# Patient Record
Sex: FEMALE | Race: WHITE | Employment: PART TIME | URBAN - METROPOLITAN AREA
[De-identification: names, ages, dates, MRNs, and addresses within clinical notes are randomized per-mention and may not be internally consistent; named-entity substitution may affect disease eponyms.]

---

## 2019-05-10 ENCOUNTER — HOSPITAL ENCOUNTER (OUTPATIENT)
Dept: ULTRASOUND IMAGING | Age: 24
Discharge: HOME | End: 2019-05-10
Payer: COMMERCIAL

## 2019-05-10 DIAGNOSIS — N63.0 BREAST LUMP: ICD-10-CM

## 2019-05-10 PROCEDURE — 76641 ULTRASOUND BREAST COMPLETE: CPT

## 2019-06-04 ENCOUNTER — APPOINTMENT (OUTPATIENT)
Dept: GENERAL RADIOLOGY | Age: 24
End: 2019-06-04
Payer: COMMERCIAL

## 2019-06-04 ENCOUNTER — HOSPITAL ENCOUNTER (EMERGENCY)
Age: 24
Discharge: HOME | End: 2019-06-04
Payer: COMMERCIAL

## 2019-06-04 VITALS
TEMPERATURE: 98.8 F | DIASTOLIC BLOOD PRESSURE: 73 MMHG | OXYGEN SATURATION: 97 % | WEIGHT: 180 LBS | SYSTOLIC BLOOD PRESSURE: 116 MMHG | BODY MASS INDEX: 33.99 KG/M2 | HEART RATE: 81 BPM | HEIGHT: 61 IN | RESPIRATION RATE: 20 BRPM

## 2019-06-04 DIAGNOSIS — S93.402A SPRAIN OF LEFT ANKLE, UNSPECIFIED LIGAMENT, INITIAL ENCOUNTER: Primary | ICD-10-CM

## 2019-06-04 PROCEDURE — 6370000000 HC RX 637 (ALT 250 FOR IP): Performed by: NURSE PRACTITIONER

## 2019-06-04 PROCEDURE — 73610 X-RAY EXAM OF ANKLE: CPT

## 2019-06-04 PROCEDURE — 99283 EMERGENCY DEPT VISIT LOW MDM: CPT

## 2019-06-04 RX ORDER — IBUPROFEN 400 MG/1
400 TABLET ORAL ONCE
Status: COMPLETED | OUTPATIENT
Start: 2019-06-04 | End: 2019-06-04

## 2019-06-04 RX ADMIN — IBUPROFEN 400 MG: 400 TABLET, FILM COATED ORAL at 01:35

## 2019-06-04 ASSESSMENT — PAIN DESCRIPTION - FREQUENCY: FREQUENCY: CONTINUOUS

## 2019-06-04 ASSESSMENT — PAIN SCALES - GENERAL
PAINLEVEL_OUTOF10: 7
PAINLEVEL_OUTOF10: 7

## 2019-06-04 ASSESSMENT — PAIN DESCRIPTION - DESCRIPTORS: DESCRIPTORS: PRESSURE;NUMBNESS

## 2019-06-04 ASSESSMENT — PAIN DESCRIPTION - ORIENTATION: ORIENTATION: LEFT

## 2019-06-04 ASSESSMENT — PAIN DESCRIPTION - PAIN TYPE: TYPE: ACUTE PAIN

## 2019-06-04 ASSESSMENT — PAIN DESCRIPTION - LOCATION: LOCATION: ANKLE

## 2019-06-04 NOTE — ED NOTES
Ice pack placed on patient's left ankle prior to going to xray. Air cast applied to left ankle and crutches given.      Avinash Martinez RN  06/04/19 9590

## 2019-06-04 NOTE — ED PROVIDER NOTES
[facility]     Department of Emergency Medicine   ED  Provider Note  Admit Date/RoomTime: 6/4/2019  1:02 AM  ED Room: 12/12  Chief Complaint:   Fall and Ankle Pain (left ankle)    History of Present Illness   Source of history provided by:  patient and EMS personnel. History/Exam Limitations: none. Fay Taylor is a 26 y.o. old female presenting to the emergency department by private vehicle, for Left ankle pain which occured 1 hour(s) prior to arrival.  Cause of complaint: [employer] trying not to step on her pet rabbit while at home. There has been a history of no prior problems with this area in the past.  Since onset the symptoms have been moderate in degree with inability to bear weight. Her pain is aggraveated by walking and relieved by nothing. Tetanus Status: unknown. ROS    Pertinent positives and negatives are stated within HPI, all other systems reviewed and are negative. Past Surgical History:   Procedure Laterality Date    TOE SURGERY     Social History:  reports that she has been smoking cigarettes. She has a 0.75 pack-year smoking history. She has never used smokeless tobacco. She reports that she does not drink alcohol or use drugs. Family History: family history is not on file. Allergies: Latex and Penicillins    Physical Exam           ED Triage Vitals   BP Temp Temp src Pulse Resp SpO2 Height Weight   -- -- -- -- -- -- -- --       Oxygen Saturation Interpretation: Normal.    Constitutional:  Alert, development consistent with age. Neck:  Normal ROM. Supple. Ankle:  Left Lateral:              Tenderness:  moderate. Swelling: Mild. Deformity: no.             ROM: diminished range with pain. Skin:  no erythema, rash or wounds noted. Neurovascular: Motor deficit: none. Sensory deficit:   none. Pulse deficit: none. Capillary refill: normal.  Knee:              Tenderness:  none. Swelling: None. Deformity: no.             ROM: full range of motion. Skin:  no erythema, rash or wounds noted. Foot:              Tenderness:  none. Swelling: None. Deformity: no.             ROM: full range of motion. Skin:  no erythema, rash or wounds noted. Gait:  limp. Lymphatics: No lymphangitis or adenopathy noted. Neurological:  Oriented. Motor functions intact. Lab / Imaging Results   (All laboratory and radiology results have been personally reviewed by myself)  Labs:  No results found for this visit on 06/04/19. Imaging: All Radiology results interpreted by Radiologist unless otherwise noted. XR ANKLE LEFT (MIN 3 VIEWS)    (Results Pending)     ED Course / Medical Decision Making     Medications   ibuprofen (ADVIL;MOTRIN) tablet 400 mg (400 mg Oral Given 6/4/19 0133)        Consults:   None    Procedure(s):   none    MDM:      X-rays negative for acute fracture or dislocation. Plan is subsequently for symptom control, limited use and  immobilization with appropriate outpatient follow-up. Counseling: The emergency provider has spoken with the patient and discussed todays results, in addition to providing specific details for the plan of care and counseling regarding the diagnosis and prognosis. Questions are answered at this time and they are agreeable with the plan. Assessment      1. Sprain of left ankle, unspecified ligament, initial encounter      Plan   Discharge to home  Patient condition is good    New Medications     New Prescriptions    No medications on file     Electronically signed by DEYVI Ibrahim Chi, CNP   DD: 6/4/19  **This report was transcribed using voice recognition software. Every effort was made to ensure accuracy; however, inadvertent computerized transcription errors may be present.   END OF ED PROVIDER NOTE       DEYVI Smith CNP  06/04/19 0216    ATTENDING PROVIDER ATTESTATION:     Supervising Physician, on-site, available for consultation, non-participatory in the evaluation or care of this patient.          [facility] [facility],   06/04/19 3373

## 2021-06-05 ENCOUNTER — APPOINTMENT (OUTPATIENT)
Dept: GENERAL RADIOLOGY | Age: 26
End: 2021-06-05

## 2021-06-05 ENCOUNTER — HOSPITAL ENCOUNTER (EMERGENCY)
Age: 26
Discharge: HOME | End: 2021-06-05

## 2021-06-05 VITALS
DIASTOLIC BLOOD PRESSURE: 77 MMHG | WEIGHT: 170 LBS | RESPIRATION RATE: 20 BRPM | SYSTOLIC BLOOD PRESSURE: 119 MMHG | HEART RATE: 79 BPM | OXYGEN SATURATION: 98 % | BODY MASS INDEX: 32.12 KG/M2 | TEMPERATURE: 98.9 F

## 2021-06-05 DIAGNOSIS — J40 BRONCHITIS: Primary | ICD-10-CM

## 2021-06-05 LAB
ANION GAP SERPL CALCULATED.3IONS-SCNC: 13 MMOL/L (ref 7–16)
BASOPHILS ABSOLUTE: 0.07 E9/L (ref 0–0.2)
BASOPHILS RELATIVE PERCENT: 0.8 % (ref 0–2)
BUN BLDV-MCNC: 9 MG/DL (ref 6–20)
CALCIUM SERPL-MCNC: 8.9 MG/DL (ref 8.6–10.2)
CHLORIDE BLD-SCNC: 105 MMOL/L (ref 98–107)
CO2: 22 MMOL/L (ref 22–29)
CREAT SERPL-MCNC: 0.7 MG/DL (ref 0.5–1)
D DIMER: <200 NG/ML DDU
EOSINOPHILS ABSOLUTE: 0.39 E9/L (ref 0.05–0.5)
EOSINOPHILS RELATIVE PERCENT: 4.4 % (ref 0–6)
GFR AFRICAN AMERICAN: >60
GFR NON-AFRICAN AMERICAN: >60 ML/MIN/1.73
GLUCOSE BLD-MCNC: 96 MG/DL (ref 74–99)
HCG, URINE, POC: NEGATIVE
HCT VFR BLD CALC: 44.9 % (ref 34–48)
HEMOGLOBIN: 15.4 G/DL (ref 11.5–15.5)
IMMATURE GRANULOCYTES #: 0.04 E9/L
IMMATURE GRANULOCYTES %: 0.4 % (ref 0–5)
LYMPHOCYTES ABSOLUTE: 1.6 E9/L (ref 1.5–4)
LYMPHOCYTES RELATIVE PERCENT: 17.9 % (ref 20–42)
Lab: NORMAL
MAGNESIUM: 2.1 MG/DL (ref 1.6–2.6)
MCH RBC QN AUTO: 30.7 PG (ref 26–35)
MCHC RBC AUTO-ENTMCNC: 34.3 % (ref 32–34.5)
MCV RBC AUTO: 89.6 FL (ref 80–99.9)
MONOCYTES ABSOLUTE: 0.89 E9/L (ref 0.1–0.95)
MONOCYTES RELATIVE PERCENT: 10 % (ref 2–12)
NEGATIVE QC PASS/FAIL: NORMAL
NEUTROPHILS ABSOLUTE: 5.94 E9/L (ref 1.8–7.3)
NEUTROPHILS RELATIVE PERCENT: 66.5 % (ref 43–80)
PDW BLD-RTO: 12.7 FL (ref 11.5–15)
PLATELET # BLD: 229 E9/L (ref 130–450)
PMV BLD AUTO: 10 FL (ref 7–12)
POSITIVE QC PASS/FAIL: NORMAL
POTASSIUM REFLEX MAGNESIUM: 3.5 MMOL/L (ref 3.5–5)
RBC # BLD: 5.01 E12/L (ref 3.5–5.5)
SARS-COV-2, NAAT: NOT DETECTED
SODIUM BLD-SCNC: 140 MMOL/L (ref 132–146)
TROPONIN, HIGH SENSITIVITY: <6 NG/L (ref 0–9)
WBC # BLD: 8.9 E9/L (ref 4.5–11.5)

## 2021-06-05 PROCEDURE — 71046 X-RAY EXAM CHEST 2 VIEWS: CPT

## 2021-06-05 PROCEDURE — 2580000003 HC RX 258: Performed by: PHYSICIAN ASSISTANT

## 2021-06-05 PROCEDURE — 6370000000 HC RX 637 (ALT 250 FOR IP): Performed by: PHYSICIAN ASSISTANT

## 2021-06-05 PROCEDURE — 94640 AIRWAY INHALATION TREATMENT: CPT

## 2021-06-05 PROCEDURE — 83735 ASSAY OF MAGNESIUM: CPT

## 2021-06-05 PROCEDURE — 85378 FIBRIN DEGRADE SEMIQUANT: CPT

## 2021-06-05 PROCEDURE — 87635 SARS-COV-2 COVID-19 AMP PRB: CPT

## 2021-06-05 PROCEDURE — 85025 COMPLETE CBC W/AUTO DIFF WBC: CPT

## 2021-06-05 PROCEDURE — 84484 ASSAY OF TROPONIN QUANT: CPT

## 2021-06-05 PROCEDURE — 99283 EMERGENCY DEPT VISIT LOW MDM: CPT

## 2021-06-05 PROCEDURE — 80048 BASIC METABOLIC PNL TOTAL CA: CPT

## 2021-06-05 PROCEDURE — 93005 ELECTROCARDIOGRAM TRACING: CPT | Performed by: PHYSICIAN ASSISTANT

## 2021-06-05 RX ORDER — PREDNISONE 50 MG/1
50 TABLET ORAL DAILY
Qty: 5 TABLET | Refills: 0 | Status: SHIPPED | OUTPATIENT
Start: 2021-06-05 | End: 2021-06-10

## 2021-06-05 RX ORDER — ALBUTEROL SULFATE 90 UG/1
2 AEROSOL, METERED RESPIRATORY (INHALATION) 4 TIMES DAILY PRN
Qty: 1 INHALER | Refills: 0 | Status: SHIPPED | OUTPATIENT
Start: 2021-06-05 | End: 2021-09-01

## 2021-06-05 RX ORDER — IPRATROPIUM BROMIDE AND ALBUTEROL SULFATE 2.5; .5 MG/3ML; MG/3ML
1 SOLUTION RESPIRATORY (INHALATION) ONCE
Status: COMPLETED | OUTPATIENT
Start: 2021-06-05 | End: 2021-06-05

## 2021-06-05 RX ORDER — 0.9 % SODIUM CHLORIDE 0.9 %
1000 INTRAVENOUS SOLUTION INTRAVENOUS ONCE
Status: COMPLETED | OUTPATIENT
Start: 2021-06-05 | End: 2021-06-05

## 2021-06-05 RX ADMIN — IPRATROPIUM BROMIDE AND ALBUTEROL SULFATE 1 AMPULE: .5; 3 SOLUTION RESPIRATORY (INHALATION) at 22:07

## 2021-06-05 RX ADMIN — SODIUM CHLORIDE 1000 ML: 9 INJECTION, SOLUTION INTRAVENOUS at 21:24

## 2021-06-05 RX ADMIN — PREDNISONE 50 MG: 20 TABLET ORAL at 22:00

## 2021-06-05 ASSESSMENT — ENCOUNTER SYMPTOMS
VOMITING: 0
SINUS PAIN: 0
FACIAL SWELLING: 0
CONSTIPATION: 0
CHEST TIGHTNESS: 0
ABDOMINAL PAIN: 0
SINUS PRESSURE: 0
COLOR CHANGE: 0
COUGH: 1
DIARRHEA: 0
BACK PAIN: 0
WHEEZING: 0
SORE THROAT: 0
NAUSEA: 0
SHORTNESS OF BREATH: 1
TROUBLE SWALLOWING: 0

## 2021-06-05 ASSESSMENT — PAIN SCALES - GENERAL: PAINLEVEL_OUTOF10: 8

## 2021-06-05 ASSESSMENT — PAIN DESCRIPTION - ORIENTATION: ORIENTATION: RIGHT

## 2021-06-05 ASSESSMENT — PAIN DESCRIPTION - LOCATION: LOCATION: FLANK

## 2021-06-05 ASSESSMENT — PAIN DESCRIPTION - PAIN TYPE: TYPE: ACUTE PAIN

## 2021-06-06 LAB
EKG ATRIAL RATE: 82 BPM
EKG P AXIS: 58 DEGREES
EKG P-R INTERVAL: 154 MS
EKG Q-T INTERVAL: 352 MS
EKG QRS DURATION: 76 MS
EKG QTC CALCULATION (BAZETT): 411 MS
EKG R AXIS: 68 DEGREES
EKG T AXIS: 52 DEGREES
EKG VENTRICULAR RATE: 82 BPM

## 2021-06-06 PROCEDURE — 93010 ELECTROCARDIOGRAM REPORT: CPT | Performed by: INTERNAL MEDICINE

## 2021-06-06 NOTE — ED PROVIDER NOTES
[facility]        Department of Emergency Medicine   ED  Provider Note  Admit Date/RoomTime: 6/5/2021  8:15 PM  ED Room: [place] [address]  HPI:  6/5/21, Time: 9:21 PM EDT      The patient is a 53-year-old female presenting to the emergency department for 3-day history of shortness of breath and right-sided chest pain that radiates to her back. Patient states it started after she was cleaning out an old apartment and there was mold in the apartment. Patient states she has an allergy to mold. She also now has a dry cough. Patient states the pain in the right side of her chest is worse when she takes in a deep breath. She does not have a history of asthma but has been using her mom's inhaler without much relief. Patient does smoke. She does have the Nexplanon implant. She states her mother has a history of blood clots. Patient denies any concern for COVID-19. She also denies any fevers or chills, leg pain or swelling, dizziness, numbness/tingling/weakness. The history is provided by the patient. No  was used. REVIEW OF SYSTEMS:  Review of Systems   Constitutional: Negative for activity change, chills, fatigue and fever. HENT: Negative for congestion, ear pain, facial swelling, sinus pressure, sinus pain, sore throat and trouble swallowing. Respiratory: Positive for cough and shortness of breath. Negative for chest tightness and wheezing. Cardiovascular: Positive for chest pain. Negative for palpitations and leg swelling. Gastrointestinal: Negative for abdominal pain, constipation, diarrhea, nausea and vomiting. Genitourinary: Negative for dysuria, flank pain, frequency and hematuria. Musculoskeletal: Negative for back pain, neck pain and neck stiffness. Skin: Negative for color change, pallor and rash. Neurological: Negative for dizziness, weakness, light-headedness and headaches.    Psychiatric/Behavioral: Negative for agitation, behavioral problems and confusion. Pertinent positives and negatives are stated within HPI, all other systems reviewed and are negative.      --------------------------------------------- PAST HISTORY ---------------------------------------------  Past Medical History:  has a past medical history of Complication of anesthesia. Past Surgical History:  has a past surgical history that includes Toe Surgery. Social History:  reports that she has been smoking cigarettes. She has a 0.75 pack-year smoking history. She has never used smokeless tobacco. She reports that she does not drink alcohol and does not use drugs. Family History: family history is not on file. The patients home medications have been reviewed.     Allergies: Latex and Penicillins    -------------------------------------------------- RESULTS -------------------------------------------------  All laboratory and radiology results have been personally reviewed by myself   LABS:  Results for orders placed or performed during the hospital encounter of 06/05/21   COVID-19, Rapid    Specimen: Nasopharyngeal Swab   Result Value Ref Range    SARS-CoV-2, NAAT Not Detected Not Detected   CBC Auto Differential   Result Value Ref Range    WBC 8.9 4.5 - 11.5 E9/L    RBC 5.01 3.50 - 5.50 E12/L    Hemoglobin 15.4 11.5 - 15.5 g/dL    Hematocrit 44.9 34.0 - 48.0 %    MCV 89.6 80.0 - 99.9 fL    MCH 30.7 26.0 - 35.0 pg    MCHC 34.3 32.0 - 34.5 %    RDW 12.7 11.5 - 15.0 fL    Platelets 487 654 - 490 E9/L    MPV 10.0 7.0 - 12.0 fL    Neutrophils % 66.5 43.0 - 80.0 %    Immature Granulocytes % 0.4 0.0 - 5.0 %    Lymphocytes % 17.9 (L) 20.0 - 42.0 %    Monocytes % 10.0 2.0 - 12.0 %    Eosinophils % 4.4 0.0 - 6.0 %    Basophils % 0.8 0.0 - 2.0 %    Neutrophils Absolute 5.94 1.80 - 7.30 E9/L    Immature Granulocytes # 0.04 E9/L    Lymphocytes Absolute 1.60 1.50 - 4.00 E9/L    Monocytes Absolute 0.89 0.10 - 0.95 E9/L    Eosinophils Absolute 0.39 0.05 - 0.50 E9/L    Basophils Absolute 0. 07 0.00 - 0.20 O5/J   Basic Metabolic Panel w/ Reflex to MG   Result Value Ref Range    Sodium 140 132 - 146 mmol/L    Potassium reflex Magnesium 3.5 3.5 - 5.0 mmol/L    Chloride 105 98 - 107 mmol/L    CO2 22 22 - 29 mmol/L    Anion Gap 13 7 - 16 mmol/L    Glucose 96 74 - 99 mg/dL    BUN 9 6 - 20 mg/dL    CREATININE 0.7 0.5 - 1.0 mg/dL    GFR Non-African American >60 >=60 mL/min/1.73    GFR African American >60     Calcium 8.9 8.6 - 10.2 mg/dL   D-Dimer, Quantitative   Result Value Ref Range    D-Dimer, Quant <200 ng/mL DDU   Troponin   Result Value Ref Range    Troponin, High Sensitivity <6 0 - 9 ng/L   Magnesium   Result Value Ref Range    Magnesium 2.1 1.6 - 2.6 mg/dL   POC Pregnancy Urine   Result Value Ref Range    HCG, Urine, POC Negative Negative    Lot Number [id number]     Positive QC Pass/Fail Pass     Negative QC Pass/Fail Pass    EKG 12 Lead   Result Value Ref Range    Ventricular Rate 82 BPM    Atrial Rate 82 BPM    P-R Interval 154 ms    QRS Duration 76 ms    Q-T Interval 352 ms    QTc Calculation (Bazett) 411 ms    P Axis 58 degrees    R Axis 68 degrees    T Axis 52 degrees       RADIOLOGY:  Interpreted by Radiologist.  XR CHEST (2 VW)    (Results Pending)       ------------------------- NURSING NOTES AND VITALS REVIEWED ---------------------------   The nursing notes within the ED encounter and vital signs as below have been reviewed. /77   Pulse 79   Temp 98.9 °F (37.2 °C) (Oral)   Resp 20   Wt 170 lb (77.1 kg)   SpO2 98%   BMI 32.12 kg/m²   Oxygen Saturation Interpretation: Normal      ---------------------------------------------------PHYSICAL EXAM--------------------------------------    Physical Exam  Vitals and nursing note reviewed. Constitutional:       General: She is not in acute distress. Appearance: Normal appearance. She is well-developed. She is not ill-appearing. HENT:      Head: Normocephalic and atraumatic.       Mouth/Throat:      Mouth: Mucous membranes are well-appearing. EKG shows normal sinus rhythm at 82 bpm.  Patient given a DuoNeb. Labs are unremarkable including a negative D-dimer and troponin. She also has no leukocytosis to suggest infection. COVID-19 is negative. Chest x-ray shows no acute consolidation. Her breath sounds did significantly improve after 1 breathing treatment. This may be viral bronchitis versus due to the mold exposure. Patient will be given a prescription for prednisone and albuterol inhaler. She is encouraged to follow-up with a primary care physician or immediate return the emergency department any new or worsening symptoms. Patient discharged home in good condition. Counseling: The emergency provider has spoken with the patient and discussed todays results, in addition to providing specific details for the plan of care and counseling regarding the diagnosis and prognosis. Questions are answered at this time and they are agreeable with the plan.      --------------------------------- IMPRESSION AND DISPOSITION ---------------------------------    IMPRESSION  1. Bronchitis        DISPOSITION  Disposition: Discharge to home  Patient condition is good      Electronically signed by Govind Khan PA-C   DD: 6/5/21  **This report was transcribed using voice recognition software. Every effort was made to ensure accuracy; however, inadvertent computerized transcription errors may be present.   END OF ED PROVIDER NOTE          Govind Khan PA-C  06/05/21 9723

## 2021-09-01 ENCOUNTER — HOSPITAL ENCOUNTER (EMERGENCY)
Age: 26
Discharge: HOME | End: 2021-09-01
Attending: EMERGENCY MEDICINE
Payer: MEDICARE

## 2021-09-01 VITALS
WEIGHT: 186 LBS | BODY MASS INDEX: 34.23 KG/M2 | HEART RATE: 86 BPM | SYSTOLIC BLOOD PRESSURE: 120 MMHG | OXYGEN SATURATION: 98 % | HEIGHT: 62 IN | DIASTOLIC BLOOD PRESSURE: 70 MMHG | RESPIRATION RATE: 16 BRPM | TEMPERATURE: 97.3 F

## 2021-09-01 DIAGNOSIS — N39.0 URINARY TRACT INFECTION WITHOUT HEMATURIA, SITE UNSPECIFIED: Primary | ICD-10-CM

## 2021-09-01 LAB
BACTERIA: ABNORMAL /HPF
BILIRUBIN URINE: ABNORMAL
BLOOD, URINE: ABNORMAL
CLARITY: CLEAR
COLOR: ABNORMAL
GLUCOSE URINE: NEGATIVE MG/DL
HCG(URINE) PREGNANCY TEST: NEGATIVE
KETONES, URINE: ABNORMAL MG/DL
LEUKOCYTE ESTERASE, URINE: NEGATIVE
NITRITE, URINE: NEGATIVE
PH UA: 7.5 (ref 5–9)
PROTEIN UA: 30 MG/DL
RBC UA: ABNORMAL /HPF (ref 0–2)
SPECIFIC GRAVITY UA: 1.02 (ref 1–1.03)
UROBILINOGEN, URINE: 2 E.U./DL
WBC UA: ABNORMAL /HPF (ref 0–5)

## 2021-09-01 PROCEDURE — 99282 EMERGENCY DEPT VISIT SF MDM: CPT

## 2021-09-01 PROCEDURE — 81001 URINALYSIS AUTO W/SCOPE: CPT

## 2021-09-01 PROCEDURE — 81025 URINE PREGNANCY TEST: CPT

## 2021-09-01 RX ORDER — NITROFURANTOIN 25; 75 MG/1; MG/1
100 CAPSULE ORAL 2 TIMES DAILY
Qty: 20 CAPSULE | Refills: 0 | Status: SHIPPED | OUTPATIENT
Start: 2021-09-01 | End: 2021-09-11

## 2021-09-01 ASSESSMENT — PAIN DESCRIPTION - LOCATION: LOCATION: ABDOMEN

## 2021-09-01 ASSESSMENT — ENCOUNTER SYMPTOMS
COUGH: 0
SINUS PRESSURE: 0
WHEEZING: 0
EYE DISCHARGE: 0
EYE REDNESS: 0
VOMITING: 0
ABDOMINAL DISTENTION: 0
NAUSEA: 0
EYE PAIN: 0
DIARRHEA: 0
SHORTNESS OF BREATH: 0
BACK PAIN: 0
SORE THROAT: 0

## 2021-09-01 ASSESSMENT — PAIN DESCRIPTION - PAIN TYPE: TYPE: ACUTE PAIN

## 2021-09-01 ASSESSMENT — PAIN DESCRIPTION - PROGRESSION: CLINICAL_PROGRESSION: NOT CHANGED

## 2021-09-01 ASSESSMENT — PAIN DESCRIPTION - ORIENTATION: ORIENTATION: LOWER;MID

## 2021-09-01 ASSESSMENT — PAIN SCALES - GENERAL: PAINLEVEL_OUTOF10: 6

## 2021-09-01 ASSESSMENT — PAIN DESCRIPTION - ONSET: ONSET: ON-GOING

## 2021-09-01 ASSESSMENT — PAIN DESCRIPTION - FREQUENCY: FREQUENCY: INTERMITTENT

## 2021-09-01 ASSESSMENT — PAIN DESCRIPTION - DESCRIPTORS: DESCRIPTORS: SHARP

## 2021-09-01 NOTE — ED PROVIDER NOTES
The history is provided by the patient. Female  Problem  Pain quality:  Aching  Pain severity:  Mild  Onset quality:  Gradual  Timing:  Constant  Progression:  Worsening  Chronicity:  New  Recent urinary tract infections: no    Relieved by:  Nothing  Worsened by:  Nothing  Ineffective treatments:  None tried  Associated symptoms: no fever, no nausea and no vomiting    Risk factors: sexually active         Review of Systems   Constitutional: Negative for chills and fever. HENT: Negative for ear pain, sinus pressure and sore throat. Eyes: Negative for pain, discharge and redness. Respiratory: Negative for cough, shortness of breath and wheezing. Cardiovascular: Negative for chest pain. Gastrointestinal: Negative for abdominal distention, diarrhea, nausea and vomiting. Genitourinary: Positive for pelvic pain. Negative for dysuria and frequency. Musculoskeletal: Negative for arthralgias and back pain. Skin: Negative for rash and wound. Neurological: Negative for weakness and headaches. Hematological: Negative for adenopathy. Psychiatric/Behavioral: Negative. All other systems reviewed and are negative. Physical Exam  Vitals and nursing note reviewed. Constitutional:       Appearance: She is well-developed. HENT:      Head: Normocephalic and atraumatic. Eyes:      Pupils: Pupils are equal, round, and reactive to light. Cardiovascular:      Rate and Rhythm: Normal rate and regular rhythm. Heart sounds: Normal heart sounds. No murmur heard. Pulmonary:      Effort: Pulmonary effort is normal.      Breath sounds: Normal breath sounds. Abdominal:      General: Bowel sounds are normal.      Palpations: Abdomen is soft. Tenderness: There is no abdominal tenderness. There is no guarding or rebound. Musculoskeletal:      Cervical back: Normal range of motion and neck supple. Skin:     General: Skin is warm and dry.    Neurological:      Mental Status: She is alert and oriented to person, place, and time. Psychiatric:         Behavior: Behavior normal.         Thought Content: Thought content normal.         Judgment: Judgment normal.        --------------------------------------------- PAST HISTORY ---------------------------------------------  Past Medical History:  has a past medical history of Complication of anesthesia. Past Surgical History:  has a past surgical history that includes Toe Surgery. Social History:  reports that she has been smoking cigarettes. She has a 0.75 pack-year smoking history. She has never used smokeless tobacco. She reports that she does not drink alcohol and does not use drugs. Family History: family history is not on file. The patients home medications have been reviewed. Allergies: Latex and Penicillins    -------------------------------------------------- RESULTS -------------------------------------------------  Results for orders placed or performed during the hospital encounter of 09/01/21   Urinalysis with Microscopic   Result Value Ref Range    Color, UA DARK YELLOW (A) Straw/Yellow    Clarity, UA Clear Clear    Glucose, Ur Negative Negative mg/dL    Bilirubin Urine SMALL (A) Negative    Ketones, Urine TRACE (A) Negative mg/dL    Specific Gravity, UA 1.020 1.005 - 1.030    Blood, Urine MODERATE (A) Negative    pH, UA 7.5 5.0 - 9.0    Protein, UA 30 (A) Negative mg/dL    Urobilinogen, Urine 2.0 (A) <2.0 E.U./dL    Nitrite, Urine Negative Negative    Leukocyte Esterase, Urine Negative Negative    WBC, UA 1-3 0 - 5 /HPF    RBC, UA 0-1 0 - 2 /HPF    Bacteria, UA MODERATE (A) None Seen /HPF   Pregnancy, urine   Result Value Ref Range    HCG(Urine) Pregnancy Test NEGATIVE NEGATIVE     No orders to display       ------------------------- NURSING NOTES AND VITALS REVIEWED ---------------------------   The nursing notes within the ED encounter and vital signs as below have been reviewed.    /70   Pulse 86   Temp 97.3 °F (36.3 °C) (Temporal)   Resp 16   Ht 5' 2\" (1.575 m)   Wt 186 lb (84.4 kg)   LMP 07/20/2021   SpO2 98%   BMI 34.02 kg/m²   Oxygen Saturation Interpretation: Normal      ------------------------------------------ PROGRESS NOTES ------------------------------------------   I have spoken with the patient and discussed todays results, in addition to providing specific details for the plan of care and counseling regarding the diagnosis and prognosis. Their questions are answered at this time and they are agreeable with the plan.      --------------------------------- ADDITIONAL PROVIDER NOTES ---------------------------------        This patient is stable for discharge. I have shared the specific conditions for return, as well as the importance of follow-up. IMPRESSION:     1.  Urinary tract infection without hematuria, site unspecified      Patient's Medications   New Prescriptions    NITROFURANTOIN, MACROCRYSTAL-MONOHYDRATE, (MACROBID) 100 MG CAPSULE    Take 1 capsule by mouth 2 times daily for 10 days   Previous Medications    No medications on file   Modified Medications    No medications on file   Discontinued Medications    ALBUTEROL SULFATE HFA (VENTOLIN HFA) 108 (90 BASE) MCG/ACT INHALER    Inhale 2 puffs into the lungs 4 times daily as needed for Wheezing    FAMOTIDINE (PEPCID) 20 MG TABLET    Take 1 tablet by mouth 2 times daily for 7 days    PANTOPRAZOLE (PROTONIX) 40 MG TABLET    Take 1 tablet by mouth daily for 10 days         Dionne Sheikh, DO  09/01/21 1152

## 2021-09-01 NOTE — ED NOTES
Had a negative and a positive pregnancy test at home.  Does have a Nexplanon implant in left arm     Rommel Srinivasan RN  09/01/21 5982

## 2021-12-20 ENCOUNTER — APPOINTMENT (OUTPATIENT)
Dept: CT IMAGING | Age: 26
End: 2021-12-20
Payer: MEDICARE

## 2021-12-20 ENCOUNTER — HOSPITAL ENCOUNTER (EMERGENCY)
Age: 26
Discharge: HOME | End: 2021-12-20
Attending: EMERGENCY MEDICINE
Payer: MEDICARE

## 2021-12-20 VITALS
SYSTOLIC BLOOD PRESSURE: 152 MMHG | BODY MASS INDEX: 32.2 KG/M2 | RESPIRATION RATE: 20 BRPM | HEART RATE: 106 BPM | TEMPERATURE: 98.1 F | OXYGEN SATURATION: 98 % | WEIGHT: 175 LBS | HEIGHT: 62 IN | DIASTOLIC BLOOD PRESSURE: 70 MMHG

## 2021-12-20 DIAGNOSIS — N94.9 ADNEXAL CYST: ICD-10-CM

## 2021-12-20 DIAGNOSIS — R10.30 LOWER ABDOMINAL PAIN: Primary | ICD-10-CM

## 2021-12-20 DIAGNOSIS — N93.8 DYSFUNCTIONAL UTERINE BLEEDING: ICD-10-CM

## 2021-12-20 LAB
BACTERIA: ABNORMAL /HPF
BILIRUBIN URINE: NEGATIVE
BLOOD, URINE: ABNORMAL
CLARITY: ABNORMAL
CLUE CELLS: NORMAL
COLOR: YELLOW
EPITHELIAL CELLS, UA: ABNORMAL /HPF
GLUCOSE URINE: NEGATIVE MG/DL
HCG(URINE) PREGNANCY TEST: NEGATIVE
KETONES, URINE: ABNORMAL MG/DL
LEUKOCYTE ESTERASE, URINE: NEGATIVE
NITRITE, URINE: NEGATIVE
PH UA: 5.5 (ref 5–9)
PROTEIN UA: NEGATIVE MG/DL
RBC UA: ABNORMAL /HPF (ref 0–2)
SOURCE WET PREP: NORMAL
SPECIFIC GRAVITY UA: >=1.03 (ref 1–1.03)
TRICHOMONAS PREP: NORMAL
UROBILINOGEN, URINE: 1 E.U./DL
WBC UA: ABNORMAL /HPF (ref 0–5)
YEAST WET PREP: NORMAL

## 2021-12-20 PROCEDURE — 74176 CT ABD & PELVIS W/O CONTRAST: CPT

## 2021-12-20 PROCEDURE — 99283 EMERGENCY DEPT VISIT LOW MDM: CPT

## 2021-12-20 PROCEDURE — 81025 URINE PREGNANCY TEST: CPT

## 2021-12-20 PROCEDURE — 81001 URINALYSIS AUTO W/SCOPE: CPT

## 2021-12-20 PROCEDURE — 87210 SMEAR WET MOUNT SALINE/INK: CPT

## 2021-12-20 ASSESSMENT — ENCOUNTER SYMPTOMS
CONSTIPATION: 0
RHINORRHEA: 0
SHORTNESS OF BREATH: 0
COUGH: 0
NAUSEA: 0
VOMITING: 0
ABDOMINAL DISTENTION: 0
DIARRHEA: 0
ABDOMINAL PAIN: 1

## 2021-12-20 ASSESSMENT — PAIN DESCRIPTION - LOCATION: LOCATION: ABDOMEN

## 2021-12-20 ASSESSMENT — PAIN DESCRIPTION - FREQUENCY: FREQUENCY: CONTINUOUS

## 2021-12-20 ASSESSMENT — PAIN DESCRIPTION - PAIN TYPE: TYPE: ACUTE PAIN

## 2021-12-20 ASSESSMENT — PAIN SCALES - GENERAL: PAINLEVEL_OUTOF10: 7

## 2021-12-20 ASSESSMENT — PAIN DESCRIPTION - ORIENTATION: ORIENTATION: LEFT

## 2021-12-20 NOTE — ED PROVIDER NOTES
Patient is a 79-year-old female with no reported past medical history presents to the emergency department with complaint of abdominal pain. Patient states that she developed left flank pain that was intermittent approximately 4 days ago that is become more constant over the past day. Flank pain is sharp, nonradiating, constant, nothing makes it better, nothing makes it worse, moderate in intensity. Patient also endorses lower abdominal pressure that has been ongoing for the past 3 days. Patient also endorses vaginal bleeding that is off cycle. Patient states that she is on Nexplanon for birth control, had her period earlier this month and then started vaginal bleeding again on 12/16/2021. Patient denies any clots. Patient denies any chest pain or shortness of breath, fevers or chills, urinary or other GI symptoms. Patient denies any trauma. Review of Systems   Constitutional: Negative for appetite change, chills and fever. HENT: Negative for congestion and rhinorrhea. Eyes: Negative for visual disturbance. Respiratory: Negative for cough and shortness of breath. Cardiovascular: Negative for chest pain, palpitations and leg swelling. Gastrointestinal: Positive for abdominal pain. Negative for abdominal distention, constipation, diarrhea, nausea and vomiting. Genitourinary: Positive for vaginal bleeding. Negative for dysuria, frequency, urgency, vaginal discharge and vaginal pain. Musculoskeletal: Negative for arthralgias and myalgias. Skin: Negative for rash and wound. Neurological: Negative for dizziness, syncope, weakness, light-headedness, numbness and headaches. Psychiatric/Behavioral: Negative for confusion. The patient is not nervous/anxious. Physical Exam  Vitals and nursing note reviewed. Constitutional:       General: She is awake. She is not in acute distress. Appearance: She is obese. She is not ill-appearing or toxic-appearing.    HENT:      Head: Ref Range    Color, UA Yellow Straw/Yellow    Clarity, UA SLCLOUDY Clear    Glucose, Ur Negative Negative mg/dL    Bilirubin Urine Negative Negative    Ketones, Urine TRACE (A) Negative mg/dL    Specific Gravity, UA >=1.030 1.005 - 1.030    Blood, Urine LARGE (A) Negative    pH, UA 5.5 5.0 - 9.0    Protein, UA Negative Negative mg/dL    Urobilinogen, Urine 1.0 <2.0 E.U./dL    Nitrite, Urine Negative Negative    Leukocyte Esterase, Urine Negative Negative    WBC, UA 1-3 0 - 5 /HPF    RBC, UA 0-1 0 - 2 /HPF    Epithelial Cells, UA MODERATE /HPF    Bacteria, UA FEW (A) None Seen /HPF   Pregnancy, urine   Result Value Ref Range    HCG(Urine) Pregnancy Test NEGATIVE NEGATIVE       Radiology:  CT ABDOMEN PELVIS WO CONTRAST Additional Contrast? None   Final Result   Small right ovarian cyst measuring 2.9 cm. No acute process otherwise identified. Short-term follow-up recommended if   symptoms persist.      RECOMMENDATIONS:   Unavailable           ------------------------- NURSING NOTES AND VITALS REVIEWED ---------------------------  Date / Time Roomed:  12/20/2021  3:29 AM  ED Bed Assignment:  [email or web address]/[email or web address]    The nursing notes within the ED encounter and vital signs as below have been reviewed. BP (!) 152/70   Pulse 106   Temp 98.1 °F (36.7 °C) (Temporal)   Resp 20   Ht 5' 2\" (1.575 m)   Wt 175 lb (79.4 kg)   LMP 12/20/2021 Comment: 2nd one this month  SpO2 98%   BMI 32.01 kg/m²   Oxygen Saturation Interpretation: Normal      ------------------------------------------ PROGRESS NOTES ------------------------------------------  2:52 AM EST  I have spoken with the patient and discussed todays results, in addition to providing specific details for the plan of care and counseling regarding the diagnosis and prognosis. Their questions are answered at this time and they are agreeable with the plan. I discussed at length with them reasons for immediate return here for re evaluation.  They will followup with their OB/GYN and primary care physician by calling their office tomorrow. --------------------------------- ADDITIONAL PROVIDER NOTES ---------------------------------  At this time the patient is without objective evidence of an acute process requiring hospitalization or inpatient management. They have remained hemodynamically stable throughout their entire ED visit and are stable for discharge with outpatient follow-up. The plan has been discussed in detail and they are aware of the specific conditions for emergent return, as well as the importance of follow-up. There are no discharge medications for this patient. Diagnosis:  1. Lower abdominal pain    2. Dysfunctional uterine bleeding    3. Adnexal cyst      Disposition:  Patient's disposition: Discharge to home  Patient's condition is stable. 12/20/21, 2:52 AM EST.     This note is prepared by Marimar Yoo MD -PGY-2                   Marimar Yoo MD  Resident  12/20/21 9381

## 2022-03-11 ENCOUNTER — APPOINTMENT (OUTPATIENT)
Dept: GENERAL RADIOLOGY | Age: 27
End: 2022-03-11
Payer: MEDICARE

## 2022-03-11 ENCOUNTER — HOSPITAL ENCOUNTER (EMERGENCY)
Age: 27
Discharge: HOME | End: 2022-03-11
Payer: MEDICARE

## 2022-03-11 VITALS
OXYGEN SATURATION: 100 % | HEART RATE: 91 BPM | TEMPERATURE: 98.1 F | BODY MASS INDEX: 30.36 KG/M2 | SYSTOLIC BLOOD PRESSURE: 106 MMHG | RESPIRATION RATE: 18 BRPM | DIASTOLIC BLOOD PRESSURE: 68 MMHG | HEIGHT: 62 IN | WEIGHT: 165 LBS

## 2022-03-11 DIAGNOSIS — S93.609A SPRAIN OF FOOT, UNSPECIFIED LATERALITY, INITIAL ENCOUNTER: Primary | ICD-10-CM

## 2022-03-11 PROCEDURE — 99211 OFF/OP EST MAY X REQ PHY/QHP: CPT

## 2022-03-11 PROCEDURE — 73630 X-RAY EXAM OF FOOT: CPT

## 2022-03-11 PROCEDURE — 73610 X-RAY EXAM OF ANKLE: CPT

## 2022-03-11 RX ORDER — IBUPROFEN 800 MG/1
800 TABLET ORAL EVERY 8 HOURS PRN
Qty: 30 TABLET | Refills: 5 | Status: SHIPPED | OUTPATIENT
Start: 2022-03-11

## 2022-03-11 ASSESSMENT — PAIN SCALES - GENERAL: PAINLEVEL_OUTOF10: 9

## 2022-03-11 ASSESSMENT — PAIN DESCRIPTION - LOCATION: LOCATION: ANKLE

## 2022-03-11 NOTE — ED PROVIDER NOTES
Department of Emergency Medicine  [address] [place] [address]  Provider Note  Admit Date/Time: 3/11/2022  4:00 PM  Room:   NAME: Sandie Allen  : 1995  MRN: [id number]     Chief Complaint:  Foot Injury (no injury noted foot just startrd hurting  cant stand to michelle on it  started last night )    History of Present Illness        Sandie Allen is a 27 y.o. female who has a past medical history of:   Past Medical History:   Diagnosis Date    Complication of anesthesia     familial history of maligant hyperthermia; mother; uncle; maternal grandmother    presents to the urgent care center by private car for pain in her left foot. 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 She said she had a large muscle spasm in the foot last night she said it hurts to weight-bear. Denying any numbness or tingling she is denying pain anywhere else    ROS    Pertinent positives and negatives are stated within HPI, all other systems reviewed and are negative. Past Surgical History:   Procedure Laterality Date    TOE SURGERY     Social History:  reports that she has been smoking cigarettes. She has a 0.75 pack-year smoking history. She has never used smokeless tobacco. She reports current drug use. Frequency: 3.00 times per week. Drug: Marijuana Don Safer). She reports that she does not drink alcohol. Family History: family history is not on file. Allergies: Latex and Penicillins    Physical Exam   Oxygen Saturation Interpretation: Normal.   ED Triage Vitals [22 1602]   BP Temp Temp src Pulse Resp SpO2 Height Weight   106/68 98.1 °F (36.7 °C) -- 91 18 100 % 5' 2\" (1.575 m) 165 lb (74.8 kg)       Physical Exam  · Constitutional/General: Alert and oriented x3, well appearing, non toxic in NAD  · HEENT:  NC/NT.   · Neck: Supple, full ROM,  · Respiratory:  Not in respiratory distress  · CV:  Regular rate. Regular rhythm. ·   · Musculoskeletal: Moves all extremities x 4. Foot has just trace edema on the lateral aspect, there are  no ecchymosis or abrasions or open areas she has a normal pedal pulse and normal capillary refill. No calf pain or tenderness no swelling in the rest of the leg integument: skin warm and dry. No rashes. · Lymphatic: no lymphadenopathy noted  · Neurologic: GCS 15, no focal deficits,   · Psychiatric: Normal Affect    Lab / Imaging Results   (All laboratory and radiology results have been personally reviewed by myself)  Labs:  No results found for this visit on 03/11/22. Imaging: All Radiology results interpreted by Radiologist unless otherwise noted. XR FOOT LEFT (MIN 3 VIEWS)   Final Result   No acute osseous abnormality. XR ANKLE LEFT (MIN 3 VIEWS)   Final Result   No acute abnormality of the ankle. Probable old injury, medial malleolus. ED Course / Medical Decision Making   Medications - No data to display       Consult(s):   None        MDM:   Patient has pain in the left foot x-ray was obtained and there is no acute abnormality. He was placed in an Ace wrap and crutches. Instructed in crutch walking by the LPN I ordered her ibuprofen to take as needed for pain she should have ice it 10 minutes at a time every 2-3 hours if no improvement she was referred to podiatry for reevaluation        Assessment      1.  Sprain of foot, unspecified laterality, initial encounter      Plan   Discharge to home and advised to contact [facility], [address] [phone]    Schedule an appointment as soon as possible for a visit   if your foot does not improve   Patient condition is good    New Medications     New Prescriptions    IBUPROFEN (ADVIL;MOTRIN) 800 MG TABLET    Take 1 tablet by mouth every 8 hours as needed for Pain     Electronically signed by DEYVI Manning CNP   DD: 3/11/22  **This report was transcribed using voice recognition software. Every effort was made to ensure accuracy; however, inadvertent computerized transcription errors may be present.   END OF ED PROVIDER NOTE     DEYVI Fong - SHAHEEN  03/11/22 0082

## 2023-04-02 ENCOUNTER — HOSPITAL ENCOUNTER (EMERGENCY)
Age: 28
Discharge: HOME | End: 2023-04-02
Attending: FAMILY MEDICINE
Payer: MEDICAID

## 2023-04-02 VITALS
SYSTOLIC BLOOD PRESSURE: 125 MMHG | TEMPERATURE: 97.7 F | HEIGHT: 62 IN | RESPIRATION RATE: 16 BRPM | WEIGHT: 165 LBS | HEART RATE: 83 BPM | BODY MASS INDEX: 30.36 KG/M2 | DIASTOLIC BLOOD PRESSURE: 77 MMHG | OXYGEN SATURATION: 100 %

## 2023-04-02 DIAGNOSIS — N94.6 DYSMENORRHEA: Primary | ICD-10-CM

## 2023-04-02 LAB — HCG UR QL: NEGATIVE

## 2023-04-02 PROCEDURE — 81025 URINE PREGNANCY TEST: CPT

## 2023-04-02 PROCEDURE — 99283 EMERGENCY DEPT VISIT LOW MDM: CPT

## 2023-04-02 NOTE — ED PROVIDER NOTES
DISPOSITION  Disposition: Discharge to home  Patient condition is stable                 Patience Conley MD  04/02/23 1948

## 2023-08-13 ENCOUNTER — HOSPITAL ENCOUNTER (EMERGENCY)
Age: 28
Discharge: HOME | End: 2023-08-13
Attending: EMERGENCY MEDICINE
Payer: MEDICAID

## 2023-08-13 ENCOUNTER — APPOINTMENT (OUTPATIENT)
Dept: ULTRASOUND IMAGING | Age: 28
End: 2023-08-13
Payer: MEDICAID

## 2023-08-13 VITALS
HEART RATE: 72 BPM | TEMPERATURE: 98 F | RESPIRATION RATE: 20 BRPM | OXYGEN SATURATION: 99 % | WEIGHT: 168 LBS | DIASTOLIC BLOOD PRESSURE: 88 MMHG | SYSTOLIC BLOOD PRESSURE: 130 MMHG | BODY MASS INDEX: 30.73 KG/M2

## 2023-08-13 DIAGNOSIS — R11.2 NAUSEA AND VOMITING, UNSPECIFIED VOMITING TYPE: ICD-10-CM

## 2023-08-13 DIAGNOSIS — E86.0 DEHYDRATION: Primary | ICD-10-CM

## 2023-08-13 DIAGNOSIS — O26.91 COMPLICATION OF PREGNANCY IN FIRST TRIMESTER: ICD-10-CM

## 2023-08-13 LAB
ANION GAP SERPL CALCULATED.3IONS-SCNC: 12 MMOL/L (ref 7–16)
BACTERIA URNS QL MICRO: ABNORMAL
BASOPHILS # BLD: 0.04 K/UL (ref 0–0.2)
BASOPHILS NFR BLD: 0 % (ref 0–2)
BILIRUB UR QL STRIP: NEGATIVE
BUN SERPL-MCNC: 5 MG/DL (ref 6–20)
CALCIUM SERPL-MCNC: 8.8 MG/DL (ref 8.6–10.2)
CHLORIDE SERPL-SCNC: 104 MMOL/L (ref 98–107)
CLARITY UR: ABNORMAL
CO2 SERPL-SCNC: 20 MMOL/L (ref 22–29)
COLOR UR: ABNORMAL
CREAT SERPL-MCNC: 0.4 MG/DL (ref 0.5–1)
EOSINOPHIL # BLD: 0.11 K/UL (ref 0.05–0.5)
EOSINOPHILS RELATIVE PERCENT: 1 % (ref 0–6)
EPI CELLS #/AREA URNS HPF: ABNORMAL /HPF
ERYTHROCYTE [DISTWIDTH] IN BLOOD BY AUTOMATED COUNT: 12.6 % (ref 11.5–15)
GFR SERPL CREATININE-BSD FRML MDRD: >60 ML/MIN/1.73M2
GLUCOSE SERPL-MCNC: 101 MG/DL (ref 74–99)
GLUCOSE UR STRIP-MCNC: NEGATIVE MG/DL
HCT VFR BLD AUTO: 40 % (ref 34–48)
HGB BLD-MCNC: 13.8 G/DL (ref 11.5–15.5)
HGB UR QL STRIP.AUTO: NEGATIVE
IMM GRANULOCYTES # BLD AUTO: 0.04 K/UL (ref 0–0.58)
IMM GRANULOCYTES NFR BLD: 0 % (ref 0–5)
KETONES UR STRIP-MCNC: NEGATIVE MG/DL
LEUKOCYTE ESTERASE UR QL STRIP: NEGATIVE
LYMPHOCYTES NFR BLD: 1.4 K/UL (ref 1.5–4)
LYMPHOCYTES RELATIVE PERCENT: 14 % (ref 20–42)
MAGNESIUM SERPL-MCNC: 1.8 MG/DL (ref 1.6–2.6)
MCH RBC QN AUTO: 31.6 PG (ref 26–35)
MCHC RBC AUTO-ENTMCNC: 34.5 G/DL (ref 32–34.5)
MCV RBC AUTO: 91.5 FL (ref 80–99.9)
MONOCYTES NFR BLD: 0.39 K/UL (ref 0.1–0.95)
MONOCYTES NFR BLD: 4 % (ref 2–12)
NEUTROPHILS NFR BLD: 81 % (ref 43–80)
NEUTS SEG NFR BLD: 8.28 K/UL (ref 1.8–7.3)
NITRITE UR QL STRIP: NEGATIVE
PH UR STRIP: 5.5 [PH] (ref 5–9)
PLATELET # BLD AUTO: 238 K/UL (ref 130–450)
PMV BLD AUTO: 10.2 FL (ref 7–12)
POTASSIUM SERPL-SCNC: 3.3 MMOL/L (ref 3.5–5)
PROT UR STRIP-MCNC: ABNORMAL MG/DL
RBC # BLD AUTO: 4.37 M/UL (ref 3.5–5.5)
RBC #/AREA URNS HPF: ABNORMAL /HPF
SODIUM SERPL-SCNC: 136 MMOL/L (ref 132–146)
SP GR UR STRIP: >1.03 (ref 1–1.03)
UROBILINOGEN UR STRIP-ACNC: 0.2 EU/DL (ref 0–1)
WBC #/AREA URNS HPF: ABNORMAL /HPF
WBC OTHER # BLD: 10.3 K/UL (ref 4.5–11.5)

## 2023-08-13 PROCEDURE — 2580000003 HC RX 258: Performed by: EMERGENCY MEDICINE

## 2023-08-13 PROCEDURE — 81001 URINALYSIS AUTO W/SCOPE: CPT

## 2023-08-13 PROCEDURE — 85025 COMPLETE CBC W/AUTO DIFF WBC: CPT

## 2023-08-13 PROCEDURE — 96361 HYDRATE IV INFUSION ADD-ON: CPT

## 2023-08-13 PROCEDURE — 6360000002 HC RX W HCPCS: Performed by: EMERGENCY MEDICINE

## 2023-08-13 PROCEDURE — 96374 THER/PROPH/DIAG INJ IV PUSH: CPT

## 2023-08-13 PROCEDURE — 76805 OB US >/= 14 WKS SNGL FETUS: CPT

## 2023-08-13 PROCEDURE — 80048 BASIC METABOLIC PNL TOTAL CA: CPT

## 2023-08-13 PROCEDURE — 99284 EMERGENCY DEPT VISIT MOD MDM: CPT

## 2023-08-13 PROCEDURE — 83735 ASSAY OF MAGNESIUM: CPT

## 2023-08-13 RX ORDER — METOCLOPRAMIDE HYDROCHLORIDE 5 MG/ML
10 INJECTION INTRAMUSCULAR; INTRAVENOUS ONCE
Status: COMPLETED | OUTPATIENT
Start: 2023-08-13 | End: 2023-08-13

## 2023-08-13 RX ORDER — 0.9 % SODIUM CHLORIDE 0.9 %
1000 INTRAVENOUS SOLUTION INTRAVENOUS ONCE
Status: COMPLETED | OUTPATIENT
Start: 2023-08-13 | End: 2023-08-13

## 2023-08-13 RX ADMIN — SODIUM CHLORIDE 1000 ML: 9 INJECTION, SOLUTION INTRAVENOUS at 12:11

## 2023-08-13 RX ADMIN — METOCLOPRAMIDE HYDROCHLORIDE 10 MG: 5 INJECTION INTRAMUSCULAR; INTRAVENOUS at 12:12

## 2023-08-13 ASSESSMENT — PAIN SCALES - GENERAL: PAINLEVEL_OUTOF10: 7

## 2023-08-13 ASSESSMENT — ENCOUNTER SYMPTOMS
ABDOMINAL DISTENTION: 0
VOMITING: 1
EYE PAIN: 0
SHORTNESS OF BREATH: 0
PHOTOPHOBIA: 0
BACK PAIN: 0
ABDOMINAL PAIN: 1
NAUSEA: 1
SINUS PRESSURE: 0
SORE THROAT: 0
COUGH: 0
CHEST TIGHTNESS: 0
WHEEZING: 0
DIARRHEA: 1

## 2023-08-13 ASSESSMENT — PAIN - FUNCTIONAL ASSESSMENT: PAIN_FUNCTIONAL_ASSESSMENT: 0-10

## 2023-08-13 ASSESSMENT — PAIN DESCRIPTION - DESCRIPTORS: DESCRIPTORS: CRAMPING;SHARP

## 2023-08-13 ASSESSMENT — PAIN DESCRIPTION - LOCATION: LOCATION: ABDOMEN

## 2023-08-13 ASSESSMENT — PAIN DESCRIPTION - ORIENTATION: ORIENTATION: LEFT;LOWER

## 2023-08-13 NOTE — ED PROVIDER NOTES
Chief complaint:  Multiple complaints    HPI history provided by the patient  The patient presents here  2, para 1, aborta 0 currently around 16 weeks gestation. Complaining of recent nausea vomiting and diarrhea and then felt lightheaded and woozy, and then had some left-sided low abdominal or pelvic cramping and then today had a mild amount of vaginal bleeding, spotting. No back or flank pain, no general abdominal pain, no current diarrhea. Nausea and vomiting improving. No stiff neck, no specific headache. No vision changes. No fevers. No cough or shortness of breath or chest pain. No leg pain or swelling. No back or flank pain. No treatment prior to arrival.  Nothing makes her better or worse. Review of Systems   Constitutional:  Positive for fatigue. Negative for chills, diaphoresis and fever. HENT:  Negative for congestion, sinus pressure and sore throat. Eyes:  Negative for photophobia, pain and visual disturbance. Respiratory:  Negative for cough, chest tightness, shortness of breath and wheezing. Cardiovascular:  Negative for chest pain, palpitations and leg swelling. Gastrointestinal:  Positive for abdominal pain, diarrhea, nausea and vomiting. Negative for abdominal distention. Genitourinary:  Positive for vaginal bleeding. Negative for dysuria, flank pain, frequency, hematuria and urgency. Musculoskeletal:  Negative for arthralgias, back pain, gait problem, joint swelling, myalgias, neck pain and neck stiffness. Skin:  Negative for rash and wound. Neurological:  Positive for light-headedness. Negative for dizziness, tremors, seizures, syncope, facial asymmetry, speech difficulty, weakness, numbness and headaches. All other systems reviewed and are negative. Physical Exam  Vitals and nursing note reviewed. Constitutional:       General: She is awake. She is not in acute distress. Appearance: She is well-developed.  She is not ill-appearing,

## 2023-08-13 NOTE — ED NOTES
Called to [address], pt bladder full, US stated to bring pt in 15 min     Whitley Schulz, RN  08/13/23 8468

## 2024-01-05 ENCOUNTER — HOSPITAL ENCOUNTER (INPATIENT)
Age: 29
LOS: 2 days | Discharge: HOME | DRG: 560 | End: 2024-01-07
Attending: OBSTETRICS & GYNECOLOGY | Admitting: OBSTETRICS & GYNECOLOGY
Payer: MEDICAID

## 2024-01-05 PROBLEM — Z34.93 NORMAL PREGNANCY IN THIRD TRIMESTER: Status: ACTIVE | Noted: 2024-01-05

## 2024-01-05 LAB
ABO + RH BLD: NORMAL
ALBUMIN SERPL-MCNC: 3.3 G/DL (ref 3.5–5.2)
ALP SERPL-CCNC: 181 U/L (ref 35–104)
ALT SERPL-CCNC: 34 U/L (ref 0–32)
AMPHET UR QL SCN: NEGATIVE
ANION GAP SERPL CALCULATED.3IONS-SCNC: 16 MMOL/L (ref 7–16)
ARM BAND NUMBER: NORMAL
AST SERPL-CCNC: 20 U/L (ref 0–31)
BARBITURATES UR QL SCN: NEGATIVE
BASOPHILS # BLD: 0.06 K/UL (ref 0–0.2)
BASOPHILS NFR BLD: 0 % (ref 0–2)
BENZODIAZ UR QL: NEGATIVE
BILIRUB SERPL-MCNC: 0.5 MG/DL (ref 0–1.2)
BLOOD BANK SAMPLE EXPIRATION: NORMAL
BLOOD GROUP ANTIBODIES SERPL: NEGATIVE
BUN SERPL-MCNC: 7 MG/DL (ref 6–20)
BUPRENORPHINE UR QL: NEGATIVE
CALCIUM SERPL-MCNC: 8.7 MG/DL (ref 8.6–10.2)
CANNABINOIDS UR QL SCN: POSITIVE
CHLORIDE SERPL-SCNC: 104 MMOL/L (ref 98–107)
CO2 SERPL-SCNC: 16 MMOL/L (ref 22–29)
COCAINE UR QL SCN: NEGATIVE
CREAT SERPL-MCNC: 0.5 MG/DL (ref 0.5–1)
EOSINOPHIL # BLD: 0.13 K/UL (ref 0.05–0.5)
EOSINOPHILS RELATIVE PERCENT: 1 % (ref 0–6)
ERYTHROCYTE [DISTWIDTH] IN BLOOD BY AUTOMATED COUNT: 13.2 % (ref 11.5–15)
FENTANYL UR QL: NEGATIVE
GFR SERPL CREATININE-BSD FRML MDRD: >60 ML/MIN/1.73M2
GLUCOSE SERPL-MCNC: 103 MG/DL (ref 74–99)
HCT VFR BLD AUTO: 41.5 % (ref 34–48)
HGB BLD-MCNC: 14.1 G/DL (ref 11.5–15.5)
IMM GRANULOCYTES # BLD AUTO: 0.09 K/UL (ref 0–0.58)
IMM GRANULOCYTES NFR BLD: 1 % (ref 0–5)
LYMPHOCYTES NFR BLD: 3.49 K/UL (ref 1.5–4)
LYMPHOCYTES RELATIVE PERCENT: 22 % (ref 20–42)
MCH RBC QN AUTO: 29.2 PG (ref 26–35)
MCHC RBC AUTO-ENTMCNC: 34 G/DL (ref 32–34.5)
MCV RBC AUTO: 85.9 FL (ref 80–99.9)
METHADONE UR QL: NEGATIVE
MONOCYTES NFR BLD: 1.05 K/UL (ref 0.1–0.95)
MONOCYTES NFR BLD: 7 % (ref 2–12)
NEUTROPHILS NFR BLD: 70 % (ref 43–80)
NEUTS SEG NFR BLD: 11.26 K/UL (ref 1.8–7.3)
OPIATES UR QL SCN: NEGATIVE
OXYCODONE UR QL SCN: NEGATIVE
PCP UR QL SCN: NEGATIVE
PLATELET # BLD AUTO: 298 K/UL (ref 130–450)
PMV BLD AUTO: 10.4 FL (ref 7–12)
POTASSIUM SERPL-SCNC: 3.8 MMOL/L (ref 3.5–5)
PROT SERPL-MCNC: 6.5 G/DL (ref 6.4–8.3)
RBC # BLD AUTO: 4.83 M/UL (ref 3.5–5.5)
SODIUM SERPL-SCNC: 136 MMOL/L (ref 132–146)
TEST INFORMATION: ABNORMAL
WBC OTHER # BLD: 16.1 K/UL (ref 4.5–11.5)

## 2024-01-05 PROCEDURE — 86850 RBC ANTIBODY SCREEN: CPT

## 2024-01-05 PROCEDURE — 0HQ9XZZ REPAIR PERINEUM SKIN, EXTERNAL APPROACH: ICD-10-PCS | Performed by: ADVANCED PRACTICE MIDWIFE

## 2024-01-05 PROCEDURE — 80307 DRUG TEST PRSMV CHEM ANLYZR: CPT

## 2024-01-05 PROCEDURE — 80053 COMPREHEN METABOLIC PANEL: CPT

## 2024-01-05 PROCEDURE — 36415 COLL VENOUS BLD VENIPUNCTURE: CPT

## 2024-01-05 PROCEDURE — 85025 COMPLETE CBC W/AUTO DIFF WBC: CPT

## 2024-01-05 PROCEDURE — 86592 SYPHILIS TEST NON-TREP QUAL: CPT

## 2024-01-05 PROCEDURE — 1220000001 HC SEMI PRIVATE L&D R&B

## 2024-01-05 PROCEDURE — G0480 DRUG TEST DEF 1-7 CLASSES: HCPCS

## 2024-01-05 PROCEDURE — 6370000000 HC RX 637 (ALT 250 FOR IP): Performed by: ADVANCED PRACTICE MIDWIFE

## 2024-01-05 PROCEDURE — 7200000001 HC VAGINAL DELIVERY

## 2024-01-05 PROCEDURE — 86900 BLOOD TYPING SEROLOGIC ABO: CPT

## 2024-01-05 PROCEDURE — 6360000002 HC RX W HCPCS: Performed by: OBSTETRICS & GYNECOLOGY

## 2024-01-05 PROCEDURE — 86901 BLOOD TYPING SEROLOGIC RH(D): CPT

## 2024-01-05 RX ORDER — ONDANSETRON 2 MG/ML
4 INJECTION INTRAMUSCULAR; INTRAVENOUS EVERY 6 HOURS PRN
Status: DISCONTINUED | OUTPATIENT
Start: 2024-01-05 | End: 2024-01-05

## 2024-01-05 RX ORDER — IBUPROFEN 800 MG/1
800 TABLET ORAL EVERY 8 HOURS PRN
Status: DISCONTINUED | OUTPATIENT
Start: 2024-01-05 | End: 2024-01-07 | Stop reason: HOSPADM

## 2024-01-05 RX ORDER — MISOPROSTOL 200 UG/1
800 TABLET ORAL PRN
Status: DISCONTINUED | OUTPATIENT
Start: 2024-01-05 | End: 2024-01-07 | Stop reason: HOSPADM

## 2024-01-05 RX ORDER — SODIUM CHLORIDE 0.9 % (FLUSH) 0.9 %
5-40 SYRINGE (ML) INJECTION EVERY 12 HOURS SCHEDULED
Status: DISCONTINUED | OUTPATIENT
Start: 2024-01-05 | End: 2024-01-07 | Stop reason: HOSPADM

## 2024-01-05 RX ORDER — CARBOPROST TROMETHAMINE 250 UG/ML
250 INJECTION, SOLUTION INTRAMUSCULAR PRN
Status: DISCONTINUED | OUTPATIENT
Start: 2024-01-05 | End: 2024-01-07 | Stop reason: HOSPADM

## 2024-01-05 RX ORDER — NALBUPHINE HYDROCHLORIDE 10 MG/ML
10 INJECTION, SOLUTION INTRAMUSCULAR; INTRAVENOUS; SUBCUTANEOUS
Status: DISCONTINUED | OUTPATIENT
Start: 2024-01-05 | End: 2024-01-05

## 2024-01-05 RX ORDER — SODIUM CHLORIDE 0.9 % (FLUSH) 0.9 %
5-40 SYRINGE (ML) INJECTION EVERY 12 HOURS SCHEDULED
Status: DISCONTINUED | OUTPATIENT
Start: 2024-01-05 | End: 2024-01-05

## 2024-01-05 RX ORDER — SODIUM CHLORIDE, SODIUM LACTATE, POTASSIUM CHLORIDE, AND CALCIUM CHLORIDE .6; .31; .03; .02 G/100ML; G/100ML; G/100ML; G/100ML
500 INJECTION, SOLUTION INTRAVENOUS PRN
Status: DISCONTINUED | OUTPATIENT
Start: 2024-01-05 | End: 2024-01-05

## 2024-01-05 RX ORDER — SODIUM CHLORIDE 9 MG/ML
INJECTION, SOLUTION INTRAVENOUS PRN
Status: DISCONTINUED | OUTPATIENT
Start: 2024-01-05 | End: 2024-01-07 | Stop reason: HOSPADM

## 2024-01-05 RX ORDER — SODIUM CHLORIDE 9 MG/ML
25 INJECTION, SOLUTION INTRAVENOUS PRN
Status: DISCONTINUED | OUTPATIENT
Start: 2024-01-05 | End: 2024-01-05

## 2024-01-05 RX ORDER — SODIUM CHLORIDE 0.9 % (FLUSH) 0.9 %
5-40 SYRINGE (ML) INJECTION PRN
Status: DISCONTINUED | OUTPATIENT
Start: 2024-01-05 | End: 2024-01-07 | Stop reason: HOSPADM

## 2024-01-05 RX ORDER — SODIUM CHLORIDE, SODIUM LACTATE, POTASSIUM CHLORIDE, CALCIUM CHLORIDE 600; 310; 30; 20 MG/100ML; MG/100ML; MG/100ML; MG/100ML
INJECTION, SOLUTION INTRAVENOUS CONTINUOUS
Status: DISCONTINUED | OUTPATIENT
Start: 2024-01-05 | End: 2024-01-05

## 2024-01-05 RX ORDER — MODIFIED LANOLIN
OINTMENT (GRAM) TOPICAL PRN
Status: DISCONTINUED | OUTPATIENT
Start: 2024-01-05 | End: 2024-01-07 | Stop reason: HOSPADM

## 2024-01-05 RX ORDER — PRENATAL WITH FERROUS FUM AND FOLIC ACID 3080; 920; 120; 400; 22; 1.84; 3; 20; 10; 1; 12; 200; 27; 25; 2 [IU]/1; [IU]/1; MG/1; [IU]/1; MG/1; MG/1; MG/1; MG/1; MG/1; MG/1; UG/1; MG/1; MG/1; MG/1; MG/1
1 TABLET ORAL DAILY
COMMUNITY

## 2024-01-05 RX ORDER — METHYLERGONOVINE MALEATE 0.2 MG/ML
200 INJECTION INTRAVENOUS PRN
Status: DISCONTINUED | OUTPATIENT
Start: 2024-01-05 | End: 2024-01-07 | Stop reason: HOSPADM

## 2024-01-05 RX ORDER — ACETAMINOPHEN 500 MG
1000 TABLET ORAL EVERY 8 HOURS PRN
Status: DISCONTINUED | OUTPATIENT
Start: 2024-01-05 | End: 2024-01-07 | Stop reason: HOSPADM

## 2024-01-05 RX ORDER — SODIUM CHLORIDE 0.9 % (FLUSH) 0.9 %
5-40 SYRINGE (ML) INJECTION PRN
Status: DISCONTINUED | OUTPATIENT
Start: 2024-01-05 | End: 2024-01-05

## 2024-01-05 RX ORDER — SODIUM CHLORIDE, SODIUM LACTATE, POTASSIUM CHLORIDE, AND CALCIUM CHLORIDE .6; .31; .03; .02 G/100ML; G/100ML; G/100ML; G/100ML
1000 INJECTION, SOLUTION INTRAVENOUS PRN
Status: DISCONTINUED | OUTPATIENT
Start: 2024-01-05 | End: 2024-01-05

## 2024-01-05 RX ADMIN — IBUPROFEN 800 MG: 800 TABLET, FILM COATED ORAL at 19:32

## 2024-01-05 RX ADMIN — WITCH HAZEL: 500 SOLUTION RECTAL; TOPICAL at 15:37

## 2024-01-05 RX ADMIN — Medication 10 UNITS: at 11:02

## 2024-01-05 RX ADMIN — Medication: at 15:37

## 2024-01-05 ASSESSMENT — PAIN SCALES - GENERAL: PAINLEVEL_OUTOF10: 7

## 2024-01-05 NOTE — PROGRESS NOTES
Two hour recovery period completed. Pt ambulated to bathroom, tolerated well. Voided. Pt then transferred to  216 via wheelchair. POC discussed. Pt verbalized understanding. Call light in reach.

## 2024-01-05 NOTE — H&P
negative  Allergic/Immunologic: negative  Psychosocial: negative    PHYSICAL EXAM:    General appearance:  awake, alert, cooperative, no apparent distress, and appears stated age  Neurologic:  Awake, alert, oriented to name, place and time.  Cranial nerves II-XII are grossly intact.and gait is normal.  Lungs:  No increased work of breathing, good air exchange,  Heart:  regular rate and rhythm   Abdomen:  Gravid with normal bowel sounds, soft, non-distended, non-tender, no masses palpated  Pelvis:  External Genitalia: General appearance; normal, Hair distribution; normal, Lesions absent.    Fetal heart rate: Category 1     Cervix:  Complete +2    Contraction frequency: 2-3   Membranes: SROM    /80   Pulse 80   Temp 98 °F (36.7 °C) (Oral)   Resp 16   LMP 03/31/2023   Breastfeeding Unknown     General Labs:    Lab Results   Component Value Date    WBC 16.1 (H) 01/05/2024    HGB 14.1 01/05/2024    HCT 41.5 01/05/2024    MCV 85.9 01/05/2024     01/05/2024      Lab Results   Component Value Date     01/05/2024    K 3.8 01/05/2024     01/05/2024    CO2 16 (L) 01/05/2024    BUN 7 01/05/2024    CREATININE 0.5 01/05/2024    GLUCOSE 103 (H) 01/05/2024    CALCIUM 8.7 01/05/2024    PROT 6.5 01/05/2024    LABALBU 3.3 (L) 01/05/2024    BILITOT 0.5 01/05/2024    ALKPHOS 181 (H) 01/05/2024    AST 20 01/05/2024    ALT 34 (H) 01/05/2024    LABGLOM >60 01/05/2024    GFRAA >60 06/05/2021     No results found for: \"LABURIC\", \"URICACID\"   Lab Results   Component Value Date/Time    COLORU Dark Yellow 08/13/2023 12:02 PM    PROTEINU TRACE 08/13/2023 12:02 PM    PHUR 5.5 08/13/2023 12:02 PM    WBCUA 0 TO 5 08/13/2023 12:02 PM    RBCUA 0 TO 2 08/13/2023 12:02 PM    BACTERIA 2+ 08/13/2023 12:02 PM    CLARITYU SLCLOUDY 12/20/2021 12:55 AM    SPECGRAV >1.030 08/13/2023 12:02 PM    LEUKOCYTESUR NEGATIVE 08/13/2023 12:02 PM    UROBILINOGEN 0.2 08/13/2023 12:02 PM    BILIRUBINUR NEGATIVE 08/13/2023 12:02 PM    BLOODU

## 2024-01-05 NOTE — L&D DELIVERY SUMMARY NOTE
Department of Obstetrics and Gynecology  CN Spontaneous Vaginal Birth Note      Pre-operative Diagnosis:   pregnancy <37 weeks and Spontaneous labor    Post-operative Diagnosis:  Living  infant(s) and Male    Information for the patient's :  Jose Miguel Maria [[id number]]                  Infant Wt:   Information for the patient's :  Jose Miguel Maria [[id number]]           APGARS:     Information for the patient's :  Jose Miguel Maria [[id number]]           Anesthesia:  epidural anesthesia      Delivery Summary: spontaneous vaginal birth of a viable male infant. The head and shoulders were delivered over an intact perineum in the ANGELA position. Body was easily delivered and the infant was placed to the maternal abdomen where the cord was clamped x2 and cut with maternal permission.The placenta delivered spontaneously  3-vessel cord present .   Perineum: First degree perineal first degree left  labial laceration repaired with 3-0 Vicryl   Mother and Infant are stable.    Labor & Delivery Summary  Dilation Complete Date: 24  Dilation Complete Time: 1045    Specimen:  Placenta     Nuchal Cord: NA      Estimated blood loss: 150  cc              Condition:  infant stable to general nursery and mother stable    Blood Type and Rh: B POSITIVE

## 2024-01-05 NOTE — PROGRESS NOTES
36w3d      Pt arrived to the unit via wheelchair in labor. SVE completed, pt complete. Stated she started leaking fluids around 0430 this morning. Clear per pt. VLADISLAV Gotti called. Pt lined and labs drawn.

## 2024-01-05 NOTE — PROGRESS NOTES
Skin to skin stimulation initiated between mother and baby. Baby alert,pink, respirations regular. Mother instructed on safe skin to skin care practices with proper positioning  Of baby and assurance of unobstructed airway-verbalized under standing.

## 2024-01-05 NOTE — PROGRESS NOTES
Two hour recovery period began at 1118. Vitals, bleeding, and fundus to be assessed [id number] during this period.

## 2024-01-06 PROCEDURE — 6370000000 HC RX 637 (ALT 250 FOR IP): Performed by: ADVANCED PRACTICE MIDWIFE

## 2024-01-06 PROCEDURE — 1220000001 HC SEMI PRIVATE L&D R&B

## 2024-01-06 RX ORDER — OXYCODONE HYDROCHLORIDE AND ACETAMINOPHEN 5; 325 MG/1; MG/1
1 TABLET ORAL EVERY 4 HOURS PRN
Status: DISCONTINUED | OUTPATIENT
Start: 2024-01-06 | End: 2024-01-06

## 2024-01-06 RX ADMIN — ACETAMINOPHEN 1000 MG: 500 TABLET ORAL at 08:59

## 2024-01-06 ASSESSMENT — PAIN SCALES - GENERAL: PAINLEVEL_OUTOF10: 4

## 2024-01-06 NOTE — CARE COORDINATION
Social Work:    Reviewed chart notes. Vaginal delivery of baby boy.  Patient tested positive for marijuana.  Social service met with patient, Antonia Maria, congratulated her on her baby boy, advised her about social service role and completed an assessment.  Antonia named her son Ramila Maria.She advises that Ramila's birth father & x-boyfriend, Jack Barkley, is a  at [employer]  in [place]. Antonia does not know if Jack will be a part of Ramila's life but advises that he was not a good boyfriend and did not help her is any way.    Antonia also has a 7 year old daughter Namrata Maria. Namrata's birth father is Capo Jones, an x-boyfriend who Antonia advises is also not active in their lives   Antonia and Namrata reside with Namrata's grandmother, Antonia's e-cftrloolp-Ntnlafv's mother, Alia Jones's home, at [address] [place] [address].  Ramila Maria will be joining them upon discharge. Antonia advises that she has a new phone number and can be reached at [phone] and Alia Jones also has a new number and can be reached at [phone]. Antonia has a high school education and is employed at [employer] part-time but plans to work full-time in the near future. Her OB-GYN is DEYVI Gotti and Antonia plans to obtain Wy's pediatric care at [facility]. Antonia's mother resides in a nursing home in [place] and her step-father is . She has support from her brother and sister-in-law, Alia Jones & her extended family members.  Antonia acknowledged being treated for depression, anxiety, bipolar disorder, in her younger years between the ages of 5-17 years old but not since then.  Social work advised Antonia of signs & symptoms of post partum depression, should she experience any, and Antonia is aware to talk with her Ob-Gyn about any concerns she may have. Social work also provided a number for Antonia to obtain a [facility] PCP.  Antonia

## 2024-01-06 NOTE — PROGRESS NOTES
Assumed care of patient for this shift. Plan of care for tonight discussed, patient verbalizes understanding and has no questions at this time. Patient is resting quietly with call light in reach.

## 2024-01-06 NOTE — PROGRESS NOTES
Department of Obstetrics and Gynecology  Labor and Delivery  Post Partum Progress Note    Postpartum Day # 1    SUBJECTIVE:  Pt resting in bed, reports feeling well, denies emotional concerns. Infant feeding with out difficulty. Ambulatory per self.     OBJECTIVE:     Vitals:  BP 96/71   Pulse 78   Temp 97.8 °F (36.6 °C)   Resp 18   LMP 03/31/2023   SpO2 97%   Breastfeeding Unknown     LUNGS:  No increased work of breathing, good air exchange, clear to auscultation bilaterally, no crackles or wheezing  CARDIOVASCULAR:  Normal apical impulse, regular rate and rhythm, normal S1 and S2, no S3 or S4, and no murmur noted  ABDOMEN:  normal bowel sounds, soft and nontender  GENITAL/URINARY:  External Genitalia:  General appearance; normal, Hair distribution; normal, Lesions absent  Uterus: @ 1 below U   Minimal Lochia         CBC:    Lab Results   Component Value Date/Time    WBC 16.1 01/05/2024 10:51 AM    RBC 4.83 01/05/2024 10:51 AM    HGB 14.1 01/05/2024 10:51 AM    HCT 41.5 01/05/2024 10:51 AM    MCV 85.9 01/05/2024 10:51 AM    RDW 13.2 01/05/2024 10:51 AM     01/05/2024 10:51 AM       ASSESSMENT & PLAN:   PPD 1  SVB     Continue Current Care

## 2024-01-06 NOTE — PROGRESS NOTES
[facility] [place] Hearing screening results were discussed with parent. Questions answered. Brochure given to parent. Advised to monitor developmental milestones and contact physician for any concerns.   Sharlene Armstrong

## 2024-01-07 VITALS
SYSTOLIC BLOOD PRESSURE: 103 MMHG | OXYGEN SATURATION: 97 % | RESPIRATION RATE: 16 BRPM | DIASTOLIC BLOOD PRESSURE: 70 MMHG | TEMPERATURE: 98.9 F | HEART RATE: 76 BPM

## 2024-01-07 PROBLEM — Z34.93 NORMAL PREGNANCY IN THIRD TRIMESTER: Status: RESOLVED | Noted: 2024-01-05 | Resolved: 2024-01-07

## 2024-01-07 PROCEDURE — 90715 TDAP VACCINE 7 YRS/> IM: CPT | Performed by: ADVANCED PRACTICE MIDWIFE

## 2024-01-07 PROCEDURE — 6360000002 HC RX W HCPCS: Performed by: ADVANCED PRACTICE MIDWIFE

## 2024-01-07 PROCEDURE — 90471 IMMUNIZATION ADMIN: CPT | Performed by: ADVANCED PRACTICE MIDWIFE

## 2024-01-07 RX ORDER — IBUPROFEN 800 MG/1
800 TABLET ORAL EVERY 8 HOURS PRN
Qty: 50 TABLET | Refills: 1 | Status: SHIPPED | OUTPATIENT
Start: 2024-01-07

## 2024-01-07 RX ADMIN — TETANUS TOXOID, REDUCED DIPHTHERIA TOXOID AND ACELLULAR PERTUSSIS VACCINE, ADSORBED 0.5 ML: 5; 2.5; 8; 8; 2.5 SUSPENSION INTRAMUSCULAR at 09:52

## 2024-01-07 NOTE — PROGRESS NOTES
Assuming care of patient at this time. Report received from previous RN.  Plan of care discussed with patient.

## 2024-01-07 NOTE — PLAN OF CARE
Problem: Postpartum  Goal: Experiences normal postpartum course  Description:  Postpartum OB-Pregnancy care plan goal which identifies if the mother is experiencing a normal postpartum course  2024 002 by Megan Pollock RN  Outcome: Progressing  2024 1518 by Gloria Lipscomb  Outcome: Progressing  Goal: Appropriate maternal -  bonding  Description:  Postpartum OB-Pregnancy care plan goal which identifies if the mother and  are bonding appropriately  2024 002 by Megan Pollock RN  Outcome: Progressing  2024 1518 by Gloria Lipscomb  Outcome: Progressing     Problem: Infection - Adult  Goal: Absence of infection at discharge  2024 1518 by Gloria Lipscomb  Outcome: Progressing  Goal: Absence of infection during hospitalization  2024 1518 by Gloria Lipscomb  Outcome: Progressing     Problem: Safety - Adult  Goal: Free from fall injury  2024 by Megan Pollock RN  Outcome: Progressing  20248 by Gloria Lipscomb  Outcome: Progressing     Problem: Discharge Planning  Goal: Discharge to home or other facility with appropriate resources  2024 1518 by Gloria Lipscomb  Outcome: Progressing     
  Problem: Postpartum  Goal: Experiences normal postpartum course  Description:  Postpartum OB-Pregnancy care plan goal which identifies if the mother is experiencing a normal postpartum course  Outcome: Progressing     Problem: Postpartum  Goal: Appropriate maternal -  bonding  Description:  Postpartum OB-Pregnancy care plan goal which identifies if the mother and  are bonding appropriately  Outcome: Progressing     Problem: Infection - Adult  Goal: Absence of infection at discharge  Outcome: Progressing     Problem: Infection - Adult  Goal: Absence of infection during hospitalization  Outcome: Progressing     Problem: Safety - Adult  Goal: Free from fall injury  Outcome: Progressing     Problem: Discharge Planning  Goal: Discharge to home or other facility with appropriate resources  Outcome: Progressing     
  Problem: Postpartum  Goal: Experiences normal postpartum course  Description:  Postpartum OB-Pregnancy care plan goal which identifies if the mother is experiencing a normal postpartum course  Outcome: Progressing  Goal: Appropriate maternal -  bonding  Description:  Postpartum OB-Pregnancy care plan goal which identifies if the mother and  are bonding appropriately  Outcome: Progressing     Problem: Safety - Adult  Goal: Free from fall injury  Outcome: Progressing     
Pending discharge  
Pending discharge  
No

## 2024-01-07 NOTE — DISCHARGE SUMMARY
Obstetric Discharge Summary    Admitting Diagnosis  IUP 36 weeks  OB History          2    Para   2    Term   1       1    AB        Living   2         SAB        IAB        Ectopic        Molar        Multiple   0    Live Births   2                Reasons for Admission on 2024 10:44 AM  Normal pregnancy in third trimester [Z34.93]  No comment available  Onset of Labor    Prenatal Procedures  None    Intrapartum Procedures        Multiple birth?: No        Spontaneous Vaginal Delivery: See Labor and Delivery Summary       Postpartum Procedures  None    Postpartum/Operative Complications       [place] Data  Information for the patient's :  Jose Miguel Maria [[id number]]   male   Birth Weight: 2.722 kg (6 lb)   Discharge With Mother  Complications: No    Discharge Diagnosis       Discharge Information  Current Discharge Medication List        CONTINUE these medications which have NOT CHANGED    Details   Prenatal Vit-Fe Fumarate-FA (PRENATAL VITAMIN) 27-1 MG TABS tablet Take 1 tablet by mouth daily             No discharge procedures on file.    Discharged Condition: good    Discharge to: Home  Follow up in 6 weeks at [facility] MD.  Accompanied by self.    Discharge Date: 2024

## 2024-01-08 LAB
ABNORMAL SPECIMEN VALIDITY TEST: NORMAL
INTEGRITY CHECK, CREATININE, URINE: 118 MG/DL (ref 22–250)
INTEGRITY CHECK, OXIDANT, URINE: <40 MG/L
INTEGRITY CHECK, PH, URINE: 7 (ref 4.5–9)
INTEGRITY CHECK, SPECIFIC GRAVITY, URINE: 1.02 (ref 1–1.03)
RPR SER QL: NONREACTIVE

## 2024-01-09 LAB
CANNABIDIOL, QUANTITATIVE, URINE: 110 NG/ML
COMPLIANCE DRUG ANALYSIS, URINE: NORMAL
SPECIMEN DESCRIPTION: ABNORMAL
SPECIMEN DESCRIPTION: NORMAL
THC NORMALIZED, QUANTITIATIVE, URINE: 291.9 NG/ML
THC-COOH, QUANTITATIVE, URINE: 344.5 NG/ML

## 2024-08-15 LAB — SURGICAL PATHOLOGY REPORT: NORMAL
